# Patient Record
Sex: FEMALE | Race: BLACK OR AFRICAN AMERICAN | Employment: OTHER | URBAN - METROPOLITAN AREA
[De-identification: names, ages, dates, MRNs, and addresses within clinical notes are randomized per-mention and may not be internally consistent; named-entity substitution may affect disease eponyms.]

---

## 2019-12-31 RX ORDER — FAMOTIDINE 20 MG/1
20 TABLET, FILM COATED ORAL 2 TIMES DAILY
COMMUNITY

## 2019-12-31 RX ORDER — SIMETHICONE 125 MG
125 TABLET,CHEWABLE ORAL 2 TIMES DAILY
COMMUNITY

## 2019-12-31 RX ORDER — AMLODIPINE BESYLATE 2.5 MG/1
2.5 TABLET ORAL DAILY
COMMUNITY

## 2020-01-01 ENCOUNTER — ANESTHESIA EVENT (OUTPATIENT)
Dept: SURGERY | Age: 85
End: 2020-01-01
Payer: MEDICARE

## 2020-01-02 ENCOUNTER — ANESTHESIA (OUTPATIENT)
Dept: SURGERY | Age: 85
End: 2020-01-02
Payer: MEDICARE

## 2020-01-02 ENCOUNTER — HOSPITAL ENCOUNTER (OUTPATIENT)
Age: 85
Setting detail: OUTPATIENT SURGERY
Discharge: NURSING, REHAB OR LONG-TERM CARE | End: 2020-01-02
Payer: MEDICARE

## 2020-01-02 VITALS
RESPIRATION RATE: 16 BRPM | HEART RATE: 66 BPM | TEMPERATURE: 97.7 F | SYSTOLIC BLOOD PRESSURE: 153 MMHG | WEIGHT: 149.1 LBS | HEIGHT: 67 IN | OXYGEN SATURATION: 97 % | BODY MASS INDEX: 23.4 KG/M2 | DIASTOLIC BLOOD PRESSURE: 82 MMHG

## 2020-01-02 DIAGNOSIS — K05.30 CHRONIC PERIODONTITIS: Chronic | ICD-10-CM

## 2020-01-02 DIAGNOSIS — K02.9 DENTAL CARIES: Primary | Chronic | ICD-10-CM

## 2020-01-02 PROCEDURE — 77030018836 HC SOL IRR NACL ICUM -A

## 2020-01-02 PROCEDURE — 77030002888 HC SUT CHRMC J&J -A

## 2020-01-02 PROCEDURE — 74011250636 HC RX REV CODE- 250/636

## 2020-01-02 PROCEDURE — 74011250637 HC RX REV CODE- 250/637: Performed by: ANESTHESIOLOGY

## 2020-01-02 PROCEDURE — 76060000033 HC ANESTHESIA 1 TO 1.5 HR

## 2020-01-02 PROCEDURE — 74011250637 HC RX REV CODE- 250/637: Performed by: NURSE ANESTHETIST, CERTIFIED REGISTERED

## 2020-01-02 PROCEDURE — 74011250636 HC RX REV CODE- 250/636: Performed by: NURSE ANESTHETIST, CERTIFIED REGISTERED

## 2020-01-02 PROCEDURE — 74011000272 HC RX REV CODE- 272

## 2020-01-02 PROCEDURE — 76210000021 HC REC RM PH II 0.5 TO 1 HR

## 2020-01-02 PROCEDURE — 74011000258 HC RX REV CODE- 258: Performed by: NURSE ANESTHETIST, CERTIFIED REGISTERED

## 2020-01-02 PROCEDURE — 77030008683 HC TU ET CUF COVD -A: Performed by: ANESTHESIOLOGY

## 2020-01-02 PROCEDURE — 77030004377 HC BUR EGG STRY -B

## 2020-01-02 PROCEDURE — 76010000149 HC OR TIME 1 TO 1.5 HR

## 2020-01-02 PROCEDURE — 77030020268 HC MISC GENERAL SUPPLY

## 2020-01-02 PROCEDURE — 74011000258 HC RX REV CODE- 258

## 2020-01-02 PROCEDURE — 74011000250 HC RX REV CODE- 250

## 2020-01-02 PROCEDURE — 77030014007 HC SPNG HEMSTAT J&J -B

## 2020-01-02 PROCEDURE — 76210000006 HC OR PH I REC 0.5 TO 1 HR

## 2020-01-02 PROCEDURE — 74011000250 HC RX REV CODE- 250: Performed by: NURSE ANESTHETIST, CERTIFIED REGISTERED

## 2020-01-02 RX ORDER — PROPOFOL 10 MG/ML
INJECTION, EMULSION INTRAVENOUS AS NEEDED
Status: DISCONTINUED | OUTPATIENT
Start: 2020-01-02 | End: 2020-01-02 | Stop reason: HOSPADM

## 2020-01-02 RX ORDER — AMOXICILLIN 500 MG/1
500 CAPSULE ORAL 3 TIMES DAILY
Qty: 21 CAP | Refills: 0 | Status: SHIPPED | OUTPATIENT
Start: 2020-01-02 | End: 2020-01-09

## 2020-01-02 RX ORDER — SODIUM CHLORIDE 0.9 % (FLUSH) 0.9 %
5-40 SYRINGE (ML) INJECTION AS NEEDED
Status: DISCONTINUED | OUTPATIENT
Start: 2020-01-02 | End: 2020-01-02 | Stop reason: HOSPADM

## 2020-01-02 RX ORDER — SODIUM CHLORIDE 0.9 % (FLUSH) 0.9 %
5-40 SYRINGE (ML) INJECTION EVERY 8 HOURS
Status: DISCONTINUED | OUTPATIENT
Start: 2020-01-02 | End: 2020-01-02 | Stop reason: HOSPADM

## 2020-01-02 RX ORDER — ACETAMINOPHEN AND CODEINE PHOSPHATE 300; 30 MG/1; MG/1
1 TABLET ORAL
Qty: 15 TAB | Refills: 0 | Status: SHIPPED | OUTPATIENT
Start: 2020-01-02 | End: 2020-01-07

## 2020-01-02 RX ORDER — SODIUM CHLORIDE, SODIUM LACTATE, POTASSIUM CHLORIDE, CALCIUM CHLORIDE 600; 310; 30; 20 MG/100ML; MG/100ML; MG/100ML; MG/100ML
50 INJECTION, SOLUTION INTRAVENOUS CONTINUOUS
Status: DISCONTINUED | OUTPATIENT
Start: 2020-01-03 | End: 2020-01-02 | Stop reason: HOSPADM

## 2020-01-02 RX ORDER — FENTANYL CITRATE 50 UG/ML
INJECTION, SOLUTION INTRAMUSCULAR; INTRAVENOUS AS NEEDED
Status: DISCONTINUED | OUTPATIENT
Start: 2020-01-02 | End: 2020-01-02 | Stop reason: HOSPADM

## 2020-01-02 RX ORDER — OXYCODONE AND ACETAMINOPHEN 5; 325 MG/1; MG/1
1 TABLET ORAL ONCE
Status: COMPLETED | OUTPATIENT
Start: 2020-01-02 | End: 2020-01-02

## 2020-01-02 RX ORDER — OXYCODONE AND ACETAMINOPHEN 5; 325 MG/1; MG/1
1 TABLET ORAL
Status: COMPLETED | OUTPATIENT
Start: 2020-01-02 | End: 2020-01-02

## 2020-01-02 RX ORDER — SODIUM CHLORIDE, SODIUM LACTATE, POTASSIUM CHLORIDE, CALCIUM CHLORIDE 600; 310; 30; 20 MG/100ML; MG/100ML; MG/100ML; MG/100ML
25 INJECTION, SOLUTION INTRAVENOUS CONTINUOUS
Status: DISCONTINUED | OUTPATIENT
Start: 2020-01-02 | End: 2020-01-02 | Stop reason: HOSPADM

## 2020-01-02 RX ORDER — FENTANYL CITRATE 50 UG/ML
50 INJECTION, SOLUTION INTRAMUSCULAR; INTRAVENOUS
Status: COMPLETED | OUTPATIENT
Start: 2020-01-02 | End: 2020-01-02

## 2020-01-02 RX ORDER — LIDOCAINE HYDROCHLORIDE 10 MG/ML
0.1 INJECTION, SOLUTION EPIDURAL; INFILTRATION; INTRACAUDAL; PERINEURAL AS NEEDED
Status: DISCONTINUED | OUTPATIENT
Start: 2020-01-02 | End: 2020-01-02 | Stop reason: HOSPADM

## 2020-01-02 RX ORDER — BUPIVACAINE HYDROCHLORIDE AND EPINEPHRINE 5; 5 MG/ML; UG/ML
INJECTION, SOLUTION EPIDURAL; INTRACAUDAL; PERINEURAL AS NEEDED
Status: DISCONTINUED | OUTPATIENT
Start: 2020-01-02 | End: 2020-01-02 | Stop reason: HOSPADM

## 2020-01-02 RX ORDER — LIDOCAINE 40 MG/G
CREAM TOPICAL
COMMUNITY

## 2020-01-02 RX ORDER — CHLORHEXIDINE GLUCONATE 1.2 MG/ML
15 RINSE ORAL EVERY 12 HOURS
COMMUNITY

## 2020-01-02 RX ORDER — SUCCINYLCHOLINE CHLORIDE 100 MG/5ML
SYRINGE (ML) INTRAVENOUS AS NEEDED
Status: DISCONTINUED | OUTPATIENT
Start: 2020-01-02 | End: 2020-01-02 | Stop reason: HOSPADM

## 2020-01-02 RX ORDER — DEXTROSE MONOHYDRATE 100 MG/ML
125-250 INJECTION, SOLUTION INTRAVENOUS AS NEEDED
Status: DISCONTINUED | OUTPATIENT
Start: 2020-01-02 | End: 2020-01-02 | Stop reason: HOSPADM

## 2020-01-02 RX ORDER — MAGNESIUM SULFATE 100 %
4 CRYSTALS MISCELLANEOUS AS NEEDED
Status: DISCONTINUED | OUTPATIENT
Start: 2020-01-02 | End: 2020-01-02 | Stop reason: HOSPADM

## 2020-01-02 RX ORDER — GAUZE BANDAGE 4" X 4"
2 BANDAGE TOPICAL AS NEEDED
Qty: 2 EACH | Refills: 0 | Status: SHIPPED | OUTPATIENT
Start: 2020-01-02

## 2020-01-02 RX ORDER — LIDOCAINE HYDROCHLORIDE 20 MG/ML
INJECTION, SOLUTION EPIDURAL; INFILTRATION; INTRACAUDAL; PERINEURAL AS NEEDED
Status: DISCONTINUED | OUTPATIENT
Start: 2020-01-02 | End: 2020-01-02 | Stop reason: HOSPADM

## 2020-01-02 RX ORDER — FAMOTIDINE 20 MG/1
20 TABLET, FILM COATED ORAL ONCE
Status: COMPLETED | OUTPATIENT
Start: 2020-01-02 | End: 2020-01-02

## 2020-01-02 RX ORDER — ONDANSETRON 2 MG/ML
INJECTION INTRAMUSCULAR; INTRAVENOUS AS NEEDED
Status: DISCONTINUED | OUTPATIENT
Start: 2020-01-02 | End: 2020-01-02 | Stop reason: HOSPADM

## 2020-01-02 RX ADMIN — Medication 20 MG: at 12:58

## 2020-01-02 RX ADMIN — FAMOTIDINE 20 MG: 20 TABLET ORAL at 12:01

## 2020-01-02 RX ADMIN — FENTANYL CITRATE 50 MCG: 50 INJECTION, SOLUTION INTRAMUSCULAR; INTRAVENOUS at 14:27

## 2020-01-02 RX ADMIN — PHENYLEPHRINE HYDROCHLORIDE 50 MCG: 10 INJECTION INTRAVENOUS at 13:10

## 2020-01-02 RX ADMIN — OXYCODONE HYDROCHLORIDE AND ACETAMINOPHEN 1 TABLET: 5; 325 TABLET ORAL at 15:33

## 2020-01-02 RX ADMIN — LIDOCAINE HYDROCHLORIDE 50 MG: 20 INJECTION, SOLUTION INTRAVENOUS at 12:54

## 2020-01-02 RX ADMIN — PHENYLEPHRINE HYDROCHLORIDE 100 MCG: 10 INJECTION INTRAVENOUS at 13:07

## 2020-01-02 RX ADMIN — FENTANYL CITRATE 25 MCG: 50 INJECTION, SOLUTION INTRAMUSCULAR; INTRAVENOUS at 12:50

## 2020-01-02 RX ADMIN — PHENYLEPHRINE HYDROCHLORIDE 50 MCG: 10 INJECTION INTRAVENOUS at 13:37

## 2020-01-02 RX ADMIN — Medication 1 SPRAY: at 12:52

## 2020-01-02 RX ADMIN — FENTANYL CITRATE 25 MCG: 50 INJECTION, SOLUTION INTRAMUSCULAR; INTRAVENOUS at 12:58

## 2020-01-02 RX ADMIN — PROPOFOL 100 MG: 10 INJECTION, EMULSION INTRAVENOUS at 12:55

## 2020-01-02 RX ADMIN — ONDANSETRON 4 MG: 2 SOLUTION INTRAMUSCULAR; INTRAVENOUS at 13:50

## 2020-01-02 RX ADMIN — Medication 80 MG: at 12:57

## 2020-01-02 RX ADMIN — PROPOFOL 100 MG: 10 INJECTION, EMULSION INTRAVENOUS at 13:00

## 2020-01-02 RX ADMIN — AMPICILLIN SODIUM 2 G: 2 INJECTION, POWDER, FOR SOLUTION INTRAMUSCULAR; INTRAVENOUS at 13:05

## 2020-01-02 RX ADMIN — SODIUM CHLORIDE, SODIUM LACTATE, POTASSIUM CHLORIDE, AND CALCIUM CHLORIDE 50 ML/HR: 600; 310; 30; 20 INJECTION, SOLUTION INTRAVENOUS at 12:01

## 2020-01-02 RX ADMIN — FENTANYL CITRATE 50 MCG: 50 INJECTION, SOLUTION INTRAMUSCULAR; INTRAVENOUS at 13:21

## 2020-01-02 RX ADMIN — OXYCODONE HYDROCHLORIDE AND ACETAMINOPHEN 1 TABLET: 5; 325 TABLET ORAL at 16:06

## 2020-01-02 NOTE — ANESTHESIA POSTPROCEDURE EVALUATION
Procedure(s):  MULTIPLE  EXTRACTION times 22 teeth #5-14, 20-31.     general    Anesthesia Post Evaluation      Multimodal analgesia: multimodal analgesia used between 6 hours prior to anesthesia start to PACU discharge  Patient location during evaluation: bedside  Patient participation: complete - patient participated  Level of consciousness: awake  Pain management: adequate  Airway patency: patent  Anesthetic complications: no  Cardiovascular status: stable  Respiratory status: acceptable  Hydration status: acceptable  Post anesthesia nausea and vomiting:  controlled      Vitals Value Taken Time   /70 1/2/2020  2:47 PM   Temp 36.5 °C (97.7 °F) 1/2/2020  2:47 PM   Pulse 60 1/2/2020  3:01 PM   Resp 16 1/2/2020  3:01 PM   SpO2 100 % 1/2/2020  3:01 PM

## 2020-01-02 NOTE — ANESTHESIA PREPROCEDURE EVALUATION
Relevant Problems   No relevant active problems       Anesthetic History               Review of Systems / Medical History  Patient summary reviewed, nursing notes reviewed and pertinent labs reviewed    Pulmonary    COPD               Neuro/Psych       CVA  TIA     Cardiovascular    Hypertension          CAD, CABG and hyperlipidemia         GI/Hepatic/Renal         Renal disease: CRI       Endo/Other             Other Findings   Comments:  Thromboembolus ([facility] [address]       Stroke ([facility] [address]       Lymphedema       Hypertension       Hyperlipidemia       Hydronephrosis       History of DVT (deep vein thrombosis)       Gross hematuria       Edema of left lower extremity       Coronary artery disease       COPD (chronic obstructive pulmonary disease) (HCC)       Chronic kidney disease                    Physical Exam    Airway  Mallampati: III  TM Distance: 4 - 6 cm  Neck ROM: normal range of motion   Mouth opening: Normal     Cardiovascular    Rhythm: regular  Rate: normal  Systolic click  Murmur, Aortic area     Dental      Comments: Several loose teeth   Pulmonary  Breath sounds clear to auscultation               Abdominal         Other Findings            Anesthetic Plan    ASA: 4  Anesthesia type: general          Induction: Intravenous  Anesthetic plan and risks discussed with: Patient

## 2020-01-02 NOTE — PERIOP NOTES
Phase 2 Recovery Summary    Report received from [address] [place] [address] Drive:    01/02/20 1413 01/02/20 1417 01/02/20 1422 01/02/20 1427   BP:  (!) 168/91 152/77 137/72   Pulse:  78 68 66   Resp:  24 23 17   Temp:       SpO2: 100% 100% 100% 98%   Weight:       Height:           oriented to time, place, person and situation          Lines and Drains  Peripheral Intravenous Line:   Peripheral IV 01/02/20 Left Hand (Active)   Site Assessment Clean, dry, & intact 1/2/2020  2:12 PM   Phlebitis Assessment 0 1/2/2020  2:12 PM   Infiltration Assessment 0 1/2/2020  2:12 PM   Dressing Status Clean, dry, & intact 1/2/2020  2:12 PM   Dressing Type Trach dressing;Transparent 1/2/2020  2:12 PM   Hub Color/Line Status Pink; Infusing;Patent 1/2/2020  2:12 PM   Alcohol Cap Used Yes 1/2/2020  2:12 PM       Wound  Wound Mouth gums (Active)   Dressing Status Clean, dry, and intact 1/2/2020  2:12 PM   Dressing Type 4 x 4 1/2/2020  2:12 PM   Incision Site Well Approximated Yes 1/2/2020  2:04 PM   Number of days: 0        Patient assisted to chair with minimal assist. Pateint tolerating liquids well. Patient's family at bedside. Discharge discussed with patient and family. No questions or concerns at this time. Patient had time to ask any questions. Patient discharged to home, with daughter.       Pepe Conteh RN

## 2020-01-02 NOTE — H&P
Date of Surgery Update  History and physical has been reviewed. The patient has been examined.  There have been no significant clinical changes since the completion of the originally dated History and Physical.    Signed By: Tiff Robert DDS     January 2, 2020 12:42 PM

## 2020-01-02 NOTE — BRIEF OP NOTE
BRIEF OPERATIVE NOTE    Date of Procedure: 1/2/2020   Preoperative Diagnosis: k02.9 dental caries, chronic periodontitis  Postoperative Diagnosis: k02.9  Same  Procedure(s):  MULTIPLE  EXTRACTION times 22 teeth #5-14, 20-31  Surgeon(s) and Role:     * Mark Cabrera, RENETTA - Primary         Surgical Assistant:glen    Surgical Staff:  Circ-1: Jordan Andrade  Scrub Tech-1: Tima HODGSON  Surg Asst-1: Dandy Resendiz  Event Time In Time Out   Incision Start 1310    Incision Close 1358      Anesthesia: General   Estimated Blood Loss: 75cc  Specimens: * No specimens in log *   Findings: as expected   Complications: none  Implants: * No implants in log *

## 2020-01-02 NOTE — DISCHARGE INSTRUCTIONS
Patient armband removed and given to patient to take home. Patient was informed of the privacy risks if armband lost or stolen    Patient Education        Dental Surgery: What to Expect at [address] [place] [address] surgery includes procedures such as tooth extractions, root canals, gum surgery, and dental implants. Your procedure may be done by:  · A dentist.  · An oral surgeon. · An endodontist, for root canals. · A periodontist, for gum surgery. You may have some pain, bleeding, or swelling afterward, depending on the procedure. You may get medicine for pain. The pain should improve steadily after the surgery. This care sheet gives you a general idea about how long it will take for you to recover. But each person recovers at a different pace. Follow the steps below to get better as quickly as possible. How can you care for yourself at home? Activity    · Allow the area to heal. Don't move quickly or lift anything heavy until you are feeling better.     · Rest when you feel tired.     · Your dentist may give you specific instructions on when you can do your normal activities again, such as driving and going back to work. Diet    · Eat soft foods, such as gelatin, pudding, or a thin soup. Gradually add solid foods to your diet as you heal. You can eat solid foods again in about a week.     · If you had a tooth pulled, don't use a straw for the first few days. Sucking on a straw can loosen the blood clot that forms at the surgery site. If this happens, it can delay healing. Medicines    · Your doctor will tell you if and when you can restart your medicines. He or she will also give you instructions about taking any new medicines.     · If you take blood thinners, such as warfarin (Coumadin), clopidogrel (Plavix), or aspirin, be sure to talk to your doctor. He or she will tell you if and when to start taking those medicines again.  Make sure that you understand exactly what your doctor wants you to do.     · Be safe with medicines. Read and follow all instructions on the label. ? If the dentist gave you a prescription medicine for pain, take it as prescribed. ? If you are not taking a prescription pain medicine, ask your dentist if you can take an over-the-counter medicine.     · If your dentist prescribed antibiotics, take them as directed. Do not stop taking them just because you feel better. You need to take the full course of antibiotics. Incision care    · While your mouth is numb, be careful not to bite your tongue or the inside of your cheek or lip.     · If you had a tooth pulled, bite gently on a gauze pad now and then. Change the pad as it becomes soaked with blood. Call your dentist or oral surgeon if you still have bleeding 24 hours after your surgery.     · If you had stitches in your gums, your dentist will tell you if and when you need to come back to have them removed.     · Starting 24 hours after your tooth was pulled, gently rinse your mouth with warm salt water several times a day to reduce swelling and relieve pain.     · Continue to brush your teeth and tongue carefully. Floss when your dentist says you can.    Ice and heat    · If needed, put ice or a cold pack on your cheek for 10 to 20 minutes at a time. Try to do this every 1 to 2 hours for the next 3 days (when you are awake) or until the swelling goes down. Put a thin cloth between the ice and your skin. Other instructions    · Do not smoke for at least 24 hours after your surgery. Smoking can delay healing. Smoking also decreases the blood supply and can bring germs and contaminants to the mouth. Follow-up care is a key part of your treatment and safety. Be sure to make and go to all appointments, and call your dentist if you are having problems. It's also a good idea to know your test results and keep a list of the medicines you take. When should you call for help? Call 911 anytime you think you may need emergency care.  For example, call if:    · You passed out (lost consciousness).     · You have severe trouble breathing.    Call your dentist now or seek immediate medical care if:    · You have pain that does not get better after you take pain medicine.     · You have loose stitches, or your incision comes open.     · You have new or more bleeding from the site.     · You have signs of infection, such as:  ? Increased pain, swelling, warmth, or redness. ? Red streaks leading from the area. ? Pus draining from the area. ? A fever.     · You have new or worse nausea or vomiting.     · You are too sick to your stomach to drink any fluids.     · You cannot keep down fluids.    Watch closely for changes in your health, and be sure to contact your dentist if you have any problems. Where can you learn more? Go to http://veronica-miles.info/. Enter Z460 in the search box to learn more about \"Dental Surgery: What to Expect at Home. \"  Current as of: October 3, 2018  Content Version: 12.2  © 7743-3777 [employer]. Care instructions adapted under license by [employer] (which disclaims liability or warranty for this information). If you have questions about a medical condition or this instruction, always ask your healthcare professional. [address] any warranty or liability for your use of this information. DISCHARGE SUMMARY from Nurse    PATIENT INSTRUCTIONS:    After general anesthesia or intravenous sedation, for 24 hours or while taking prescription Narcotics:  · Limit your activities  · Do not drive and operate hazardous machinery  · Do not make important personal or business decisions  · Do  not drink alcoholic beverages  · If you have not urinated within 8 hours after discharge, please contact your surgeon on call.     Report the following to your surgeon:  · Excessive pain, swelling, redness or odor of or around the surgical area  · Temperature over 100.5  · Nausea and vomiting lasting longer than 4 hours or if unable to take medications  · Any signs of decreased circulation or nerve impairment to extremity: change in color, persistent  numbness, tingling, coldness or increase pain  · Any questions    What to do at Home:  Recommended activity: Activity as tolerated,       *  Please give a list of your current medications to your Primary Care Provider. *  Please update this list whenever your medications are discontinued, doses are      changed, or new medications (including over-the-counter products) are added. *  Please carry medication information at all times in case of emergency situations. These are general instructions for a healthy lifestyle:    No smoking/ No tobacco products/ Avoid exposure to second hand smoke  Surgeon General's Warning:  Quitting smoking now greatly reduces serious risk to your health. Obesity, smoking, and sedentary lifestyle greatly increases your risk for illness    A healthy diet, regular physical exercise & weight monitoring are important for maintaining a healthy lifestyle    You may be retaining fluid if you have a history of heart failure or if you experience any of the following symptoms:  Weight gain of 3 pounds or more overnight or 5 pounds in a week, increased swelling in our hands or feet or shortness of breath while lying flat in bed. Please call your doctor as soon as you notice any of these symptoms; do not wait until your next office visit. The discharge information has been reviewed with the patient. The patient verbalized understanding. Discharge medications reviewed with the patient and appropriate educational materials and side effects teaching were provided.   ___________________________________________________________________________________________________________________________________

## 2020-01-02 NOTE — PERIOP NOTES
Pre-Op Summary    Pt arrived via car with family/friend and is oriented to time, place, person and situation. Patient with steady gait with none assistive devices. Visit Vitals  /84 (BP 1 Location: Left arm, BP Patient Position: Sitting)   Pulse 73   Temp 99.2 °F (37.3 °C)   Resp 18   Ht 5' 7\" (1.702 m)   Wt 67.6 kg (149 lb 1.6 oz)   SpO2 99%   BMI 23.35 kg/m²       Peripheral IV located on Left hand . Patients belongings are located in [address] [place] [address]. Patient's point of contact is Yvon Greenberg and their contact number is: [phone]. They will be in the waiting room. They are able to receive medication information. They will be their ride home.

## 2020-01-03 NOTE — OP NOTES
[facility]  OPERATIVE REPORT    Name:  Calixto Schofield  MR#:   [id number]  :  1935  ACCOUNT #:  [de-identified]  DATE OF SERVICE:  2020    PREOPERATIVE DIAGNOSES:  1. Dental caries. 2.  Chronic periodontitis. POSTOPERATIVE DIAGNOSES:  1. Dental caries. 2.  Chronic periodontitis. PROCEDURE PERFORMED:  Full mouth extraction including teeth #5 through #14 and #20 through #31. SURGEON:  Alfredo Cabrera DDS    ASSISTANT:  none    ANESTHESIA:  General via nasal endotracheal intubation. COMPLICATIONS:  None. SPECIMENS REMOVED:  None. IMPLANTS:none    ESTIMATED BLOOD LOSS:  75 mL. SUTURE:  3-0 chromic. FINDINGS:  As expected from the diagnoses. PROCEDURE:  The patient was brought to the operating room and placed on the operating room table in the supine position. After the administration of adequate general anesthetic via nasal endotracheal intubation, the patient was prepped and draped in sterile operating room fashion. The pharynx was suctioned with a Yankauer suction, and a moist throat pack was placed. Attention was first directed to the maxillary arch where an incision was made around the necks of teeth #5 through #14. A full-thickness mucoperiosteal flap was elevated using a #9 Molt elevator. Bone removal was required from teeth #6, #11, and #14. Teeth #5 through #14 were then elevated and extracted in total.  Alveoloplasty was done with a bone bur and a bone file. The area was thoroughly debrided and irrigated. The flap was closed using two running interlocking 3-0 chromic sutures. Next, attention was directed to the mandibular arch, where an incision was made around the necks of teeth #20 through #31. A full-thickness flap was opened using a #9 Molt elevator. Teeth #20, #21, #22, #27, #30, and #31 required bone removal for extraction. All teeth #20 through #31 were then elevated and extracted, many of them breaking off.   Once the teeth were extracted, alveoloplasty was performed using a bone bur and a bone file. The area was thoroughly debrided and irrigated. The flap closed again using two running interlocking 3-0 chromic sutures. The throat pack was removed and the pharynx suctioned with the Yankauer suction. The patient was taken to the recovery room in stable condition.         BAIRON Tan DDS      [id number]/  D:  01/02/2020 18:42  T:  01/02/2020 21:18  JOB #:  [id number]

## (undated) DEVICE — STERILE POLYISOPRENE POWDER-FREE SURGICAL GLOVES: Brand: PROTEXIS

## (undated) DEVICE — TAPE UMB 1/8X30IN MP COT WHT --

## (undated) DEVICE — CATHETER IV STR HUB OCRILON POLYUR RADPQ PUSH BTTN 20GAX1IN

## (undated) DEVICE — FLEXIBLE YANKAUER,MEDIUM TIP, NO VACUUM CONTROL: Brand: ARGYLE

## (undated) DEVICE — BUR SURG L44.5MM HD L7.9MM DIA4MM MIC ORAL CARB OVL 8 FLUT

## (undated) DEVICE — Device

## (undated) DEVICE — MAGNETIC INSTRUMENT PAD 10" X 16"; MEDIUM; DISPOSABLE: Brand: CARDINAL HEALTH

## (undated) DEVICE — SOLUTION IV 1000ML 0.9% SOD CHL

## (undated) DEVICE — 10FR FRAZIER SUCTION HANDLE: Brand: CARDINAL HEALTH

## (undated) DEVICE — INTENDED FOR TISSUE SEPARATION, AND OTHER PROCEDURES THAT REQUIRE A SHARP SURGICAL BLADE TO PUNCTURE OR CUT.: Brand: BARD-PARKER ® CARBON RIB-BACK BLADES

## (undated) DEVICE — SYR LR LCK 1ML GRAD NSAF 30ML --

## (undated) DEVICE — DEPAUL MINOR HEAD AND NECK: Brand: MEDLINE INDUSTRIES, INC.

## (undated) DEVICE — SPONGE GZ W4XL4IN COT 12 PLY TYP VII WVN C FLD DSGN

## (undated) DEVICE — PACKING GZ W2INXL6FT WVN COT VAG RADPQ

## (undated) DEVICE — DEPAUL MAJOR HEAD AND NECK: Brand: MEDLINE INDUSTRIES, INC.

## (undated) DEVICE — SURGIFOAM SPNG SZ 100

## (undated) DEVICE — SUTURE CHROMIC GUT SZ 3-0 L27IN ABSRB BRN L19MM PS-2 3/8 1638H

## (undated) DEVICE — SYR 10ML CTRL LR LCK NSAF LF --

## (undated) DEVICE — 20 ML SYRINGE LUER-LOCK TIP: Brand: MONOJECT